# Patient Record
Sex: MALE | Race: WHITE | Employment: UNEMPLOYED | URBAN - METROPOLITAN AREA
[De-identification: names, ages, dates, MRNs, and addresses within clinical notes are randomized per-mention and may not be internally consistent; named-entity substitution may affect disease eponyms.]

---

## 2019-05-01 ENCOUNTER — APPOINTMENT (OUTPATIENT)
Dept: GENERAL RADIOLOGY | Age: 54
End: 2019-05-01
Payer: MEDICARE

## 2019-05-01 ENCOUNTER — HOSPITAL ENCOUNTER (EMERGENCY)
Age: 54
Discharge: HOME | End: 2019-05-01
Attending: EMERGENCY MEDICINE
Payer: MEDICARE

## 2019-05-01 ENCOUNTER — APPOINTMENT (OUTPATIENT)
Dept: CT IMAGING | Age: 54
End: 2019-05-01
Payer: MEDICARE

## 2019-05-01 VITALS
DIASTOLIC BLOOD PRESSURE: 106 MMHG | BODY MASS INDEX: 19.86 KG/M2 | HEIGHT: 67 IN | SYSTOLIC BLOOD PRESSURE: 148 MMHG | WEIGHT: 126.54 LBS | TEMPERATURE: 98.3 F | OXYGEN SATURATION: 97 % | HEART RATE: 86 BPM | RESPIRATION RATE: 18 BRPM

## 2019-05-01 DIAGNOSIS — S51.012A ELBOW LACERATION, LEFT, INITIAL ENCOUNTER: Primary | ICD-10-CM

## 2019-05-01 PROCEDURE — 70450 CT HEAD/BRAIN W/O DYE: CPT

## 2019-05-01 PROCEDURE — 6360000002 HC RX W HCPCS: Performed by: EMERGENCY MEDICINE

## 2019-05-01 PROCEDURE — 6370000000 HC RX 637 (ALT 250 FOR IP): Performed by: EMERGENCY MEDICINE

## 2019-05-01 PROCEDURE — 90471 IMMUNIZATION ADMIN: CPT | Performed by: EMERGENCY MEDICINE

## 2019-05-01 PROCEDURE — 73080 X-RAY EXAM OF ELBOW: CPT

## 2019-05-01 PROCEDURE — 90715 TDAP VACCINE 7 YRS/> IM: CPT | Performed by: EMERGENCY MEDICINE

## 2019-05-01 PROCEDURE — 99284 EMERGENCY DEPT VISIT MOD MDM: CPT

## 2019-05-01 PROCEDURE — 73090 X-RAY EXAM OF FOREARM: CPT

## 2019-05-01 PROCEDURE — 4500000024 HC ED LEVEL 4 PROCEDURE

## 2019-05-01 RX ORDER — CEPHALEXIN 500 MG/1
500 CAPSULE ORAL 3 TIMES DAILY
Qty: 21 CAPSULE | Refills: 0 | Status: SHIPPED | OUTPATIENT
Start: 2019-05-01 | End: 2019-05-08

## 2019-05-01 RX ORDER — CEPHALEXIN 250 MG/1
500 CAPSULE ORAL ONCE
Status: COMPLETED | OUTPATIENT
Start: 2019-05-01 | End: 2019-05-01

## 2019-05-01 RX ORDER — IBUPROFEN 400 MG/1
400 TABLET ORAL EVERY 6 HOURS PRN
COMMUNITY

## 2019-05-01 RX ADMIN — TETANUS TOXOID, REDUCED DIPHTHERIA TOXOID AND ACELLULAR PERTUSSIS VACCINE, ADSORBED 0.5 ML: 5; 2.5; 8; 8; 2.5 SUSPENSION INTRAMUSCULAR at 12:17

## 2019-05-01 RX ADMIN — CEPHALEXIN 500 MG: 250 CAPSULE ORAL at 14:47

## 2019-05-01 ASSESSMENT — ENCOUNTER SYMPTOMS
PHOTOPHOBIA: 0
STRIDOR: 0
VOICE CHANGE: 0
VOMITING: 0
BLOOD IN STOOL: 0
COLOR CHANGE: 0
TROUBLE SWALLOWING: 0
WHEEZING: 0
NAUSEA: 0
SHORTNESS OF BREATH: 0
FACIAL SWELLING: 0
ABDOMINAL PAIN: 0
BACK PAIN: 0

## 2019-05-01 ASSESSMENT — PAIN DESCRIPTION - ORIENTATION: ORIENTATION: LEFT

## 2019-05-01 ASSESSMENT — PAIN DESCRIPTION - PAIN TYPE: TYPE: ACUTE PAIN

## 2019-05-01 ASSESSMENT — PAIN DESCRIPTION - LOCATION: LOCATION: ELBOW

## 2019-05-01 ASSESSMENT — PAIN DESCRIPTION - DESCRIPTORS: DESCRIPTORS: TENDER

## 2019-05-01 ASSESSMENT — PAIN SCALES - GENERAL: PAINLEVEL_OUTOF10: 5

## 2019-05-01 NOTE — ED PROVIDER NOTES
[address] [facility]  eMERGENCY dEPARTMENT eNCOUnter      Pt Name: Erin Mark  MRN: [id number]  [place] 1965  Date of evaluation: 5/1/2019  Provider: Camille Sal MD    [address] [place] [address]       Chief Complaint   Patient presents with    Joint Swelling     fell last pm on sidewalk  causing injury  abrasion lf forehead  rt ear  lf elbow  and rt forearm         HISTORY OF PRESENT ILLNESS   (Location/Symptom, Timing/Onset, Context/Setting, Quality, Duration, Modifying Factors, Severity)  Note limiting factors. Erin Mark is a 48 y.o. male with hx of alcoholism who presents with injuries he suffered during a fall while intoxicated last night at approximately 8:30 PM.  The patient reports she was walking on uneven sidewalk when he stumbled and fell face forward onto the sidewalk. He reports injuries to his face, left elbow, right forearm. He denies any loss of consciousness or vomiting. He reports mild diffuse body pain but does report moderate pain at his left elbow. He also reports mild pain to his forehead, right ear, and right forearm. He is unsure when his last tetanus shot was. He reports symptoms are moderate, constant, unchanged, ongoing for approximately 15 hours, better with rest and worse with use of the affected extremities. Denies any fever, pus drainage, numbness, or weakness. HPI    Nursing Notes were reviewed. REVIEW OFSYSTEMS    (2-9 systems for level 4, 10 or more for level 5)     Review of Systems   Constitutional: Negative for appetite change, fever and unexpected weight change. HENT: Negative for facial swelling, trouble swallowing and voice change. Eyes: Negative for photophobia and visual disturbance. Respiratory: Negative for shortness of breath, wheezing and stridor. Cardiovascular: Negative for chest pain and palpitations. Gastrointestinal: Negative for abdominal pain, blood in stool, nausea and vomiting.    Genitourinary: Negative for difficulty urinating and dysuria. Musculoskeletal: Positive for arthralgias. Negative for back pain, gait problem and neck pain. Skin: Positive for wound. Negative for color change. Neurological: Positive for headaches. Negative for seizures, syncope, speech difficulty, weakness and numbness. Psychiatric/Behavioral: Negative for self-injury and suicidal ideas. Except as noted above the remainder of the review of systems was reviewed and negative. PAST MEDICAL HISTORY     Past Medical History:   Diagnosis Date    Alcoholism ([facility] [address]     GERD (gastroesophageal reflux disease)     Hypertension          SURGICAL HISTORY     No past surgical history on file. CURRENT MEDICATIONS       Previous Medications    IBUPROFEN (ADVIL;MOTRIN) 400 MG TABLET    Take 400 mg by mouth every 6 hours as needed for Pain       ALLERGIES     Patient has no known allergies. FAMILY HISTORY     No family history on file. SOCIAL HISTORY       Social History     Socioeconomic History    Marital status:      Spouse name: Not on file    Number of children: Not on file    Years of education: Not on file    Highest education level: Not on file   Occupational History    Not on file   Social Needs    Financial resource strain: Not on file    Food insecurity:     Worry: Not on file     Inability: Not on file    Transportation needs:     Medical: Not on file     Non-medical: Not on file   Tobacco Use    Smoking status: Current Every Day Smoker     Packs/day: 1.00     Types: Cigarettes   Substance and Sexual Activity    Alcohol use:  Yes     Alcohol/week: 3.6 - 7.2 oz     Types: 6 - 12 Cans of beer per week     Comment: 6-12 beer daily    Drug use: Yes     Types: Marijuana    Sexual activity: Yes     Partners: Female   Lifestyle    Physical activity:     Days per week: Not on file     Minutes per session: Not on file    Stress: Not on file   Relationships    Social connections:     Talks on phone: Not on file     Gets together: Not on file     Attends Mormonism service: Not on file     Active member of club or organization: Not on file     Attends meetings of clubs or organizations: Not on file     Relationship status: Not on file    Intimate partner violence:     Fear of current or ex partner: Not on file     Emotionally abused: Not on file     Physically abused: Not on file     Forced sexual activity: Not on file   Other Topics Concern    Not on file   Social History Narrative    Not on file         PHYSICAL EXAM    (up to 7 for level 4, 8 or more for level 5)     ED Triage Vitals [05/01/19 1129]   BP Temp Temp Source Pulse Resp SpO2 Height Weight   (!) 148/106 98.3 °F (36.8 °C) Oral 86 18 97 % 5' 7\" (1.702 m) 126 lb 8.7 oz (57.4 kg)       Physical Exam   Constitutional: He is oriented to person, place, and time. He appears well-developed and well-nourished. No distress (Non toxic appearing, in no acute distress). HENT:   Head: Normocephalic. Right Ear: External ear normal.   Left Ear: External ear normal.   Hemostatic abrasion to left frontal scalp and to right external ear with no full-thickness laceration or bleeding. No foreign bodies. No raccoon sign, Lynn sign, or hemotympanum. No evidence of basal skull fracture. Eyes: Pupils are equal, round, and reactive to light. Conjunctivae and EOM are normal.   Neck: Normal range of motion. Neck supple. No JVD present. No tracheal deviation present. No midline cervical neck tenderness to palpation. Patient freely ranging neck with no pain   Cardiovascular: Normal rate and intact distal pulses. Pulmonary/Chest: Effort normal and breath sounds normal. No respiratory distress. He has no wheezes. Abdominal: Soft. He exhibits no distension. There is no tenderness. There is no rebound and no guarding. Musculoskeletal: He exhibits tenderness. He exhibits no deformity.    Mild dorsal right mid forearm tenderness with abrasions but no patient on Keflex, and have him follow-up in his clinic either on Friday of this week or Monday of next week. He does not recommend transfer for emergent surgical washout at this time. The patient is afebrile, nontoxic-appearing, expresses understanding and agreement with this plan. Wound is thoroughly irrigated and repaired as in procedure note below. He is aware of the importance of following up with outpatient orthopedic clinic and the risks of not doing this. Standard ER return precautions are reviewed. He expresses understanding and agreement with this plan. CONSULTS:  Orthopedic Surgery, Dr. Neela Galvez:  Unless otherwise noted below, none     Lac Repair  Date/Time: 5/1/2019 2:30 PM  Performed by: Rodolfo Estrella MD  Authorized by: Rodolfo Estrella MD     Consent:     Consent obtained:  Verbal    Consent given by:  Patient    Risks discussed:  Infection, pain, poor cosmetic result and vascular damage  Anesthesia (see MAR for exact dosages): Anesthesia method:  Local infiltration    Local anesthetic:  Lidocaine 1% w/o epi  Laceration details:     Location:  Shoulder/arm    Shoulder/arm location:  L elbow    Length (cm):  2  Repair type:     Repair type:  Simple  Pre-procedure details:     Preparation:  Patient was prepped and draped in usual sterile fashion  Exploration:     Wound extent: no underlying fracture noted and no vascular damage noted      Wound extent comment:  + synovial fluid drainage  Treatment:     Area cleansed with:  Hibiclens    Amount of cleaning:  Extensive    Irrigation solution:  Sterile saline    Irrigation method:  Syringe  Skin repair:     Repair method:  Sutures    Suture size:  4-0    Suture material:  Prolene    Suture technique:  Simple interrupted    Number of sutures:  5  Approximation:     Approximation:  Close  Post-procedure details:     Dressing:  Antibiotic ointment and tube gauze    Patient tolerance of procedure:   Tolerated well, no immediate complications        FINAL IMPRESSION      1. Elbow laceration, left, initial encounter          DISPOSITION/PLAN   DISPOSITION  Discharge      PATIENT REFERRED TO:  Daylin Aparicio [facility] [address] [facility]  [address]  [phone]    In 2 days      [place] [address]  [phone]    If symptoms worsen      DISCHARGE MEDICATIONS:  New Prescriptions    CEPHALEXIN (KEFLEX) 500 MG CAPSULE    Take 1 capsule by mouth 3 times daily for 7 days          (Please note that portions of this note were completed with a voice recognition program.  Efforts were made to edit the dictations but occasionally words aremis-transcribed. )    Pattie Solano MD (electronically signed)  Attending Emergency Physician           Pattie Solano MD  05/01/19 0404

## 2019-05-01 NOTE — ED NOTES
Addiction Navigator-Namita Consult    Conducted brief intervention with pt regarding alcohol use and treatment services/options available. Pt reports that he drinks a 6-pack daily. Pt expresses that he has thought about decreasing alcohol consumption and is willing to accept treatment resources at this time. Discussed with pt the negative health impacts of heavy alcohol use and the importance of decreasing use. Provided pt with the following resources:  · [facility] treatment center   · Additional list of alcohol treatment resources    Pt was receptive throughout intervention. Will continue to assist and follow as needed.             Ilana Pastrana  05/01/19 0101

## 2019-05-22 ENCOUNTER — APPOINTMENT (OUTPATIENT)
Dept: GENERAL RADIOLOGY | Age: 54
End: 2019-05-22
Payer: MEDICARE

## 2019-05-22 ENCOUNTER — HOSPITAL ENCOUNTER (EMERGENCY)
Age: 54
Discharge: HOME | End: 2019-05-22
Payer: MEDICARE

## 2019-05-22 VITALS
OXYGEN SATURATION: 97 % | RESPIRATION RATE: 14 BRPM | WEIGHT: 125.66 LBS | SYSTOLIC BLOOD PRESSURE: 162 MMHG | BODY MASS INDEX: 19.68 KG/M2 | TEMPERATURE: 97.8 F | DIASTOLIC BLOOD PRESSURE: 97 MMHG | HEART RATE: 80 BPM

## 2019-05-22 DIAGNOSIS — S59.909A: Primary | ICD-10-CM

## 2019-05-22 DIAGNOSIS — Z48.02 ENCOUNTER FOR REMOVAL OF SUTURES: ICD-10-CM

## 2019-05-22 PROCEDURE — 99283 EMERGENCY DEPT VISIT LOW MDM: CPT

## 2019-05-22 PROCEDURE — 73080 X-RAY EXAM OF ELBOW: CPT

## 2019-05-22 RX ORDER — DOXYCYCLINE 100 MG/1
100 TABLET ORAL 2 TIMES DAILY
Qty: 14 TABLET | Refills: 0 | Status: SHIPPED | OUTPATIENT
Start: 2019-05-22 | End: 2019-05-29

## 2019-05-22 ASSESSMENT — PAIN SCALES - GENERAL
PAINLEVEL_OUTOF10: 5
PAINLEVEL_OUTOF10: 7
PAINLEVEL_OUTOF10: 7

## 2019-05-22 ASSESSMENT — ENCOUNTER SYMPTOMS
NAUSEA: 0
SHORTNESS OF BREATH: 0
VOMITING: 0
BACK PAIN: 0
DIARRHEA: 0
ABDOMINAL PAIN: 0
EYE PAIN: 0
COUGH: 0

## 2019-05-22 ASSESSMENT — PAIN DESCRIPTION - PAIN TYPE
TYPE: ACUTE PAIN
TYPE: ACUTE PAIN

## 2019-05-22 ASSESSMENT — PAIN DESCRIPTION - ORIENTATION: ORIENTATION: LEFT

## 2019-05-22 ASSESSMENT — PAIN DESCRIPTION - LOCATION: LOCATION: ELBOW

## 2019-05-22 NOTE — ED NOTES
Awake alert  resp easy  Given extra dressing snacks and bus pass. Aware to follow up with ortho.   Given 's phone number for assistance to get primary care doctor for HTN      James Lung, [address] [facility]  05/22/19 6849

## 2019-05-22 NOTE — ED NOTES
Addiction Navigator-Namita Consult     Pt known from previous encounter. Conducted brief intervention with pt regarding alcohol use and treatment services/options available. Pt reports that he has no interest in decreasing or ceasing drinking. Pt reports that he goes to [place] [address] meeting  sometimes at [facility] expresses that he doesn't see a problem with his drinking. Pt agreeable to receiving resources at this time. Provided pt with alcohol treatment resource list.  Pt was receptive to intervention and declines further addiction services.        [facility]  05/22/19 [facility] Casandra Thomass  05/22/19 1836

## 2019-05-22 NOTE — ED PROVIDER NOTES
**EVALUATED BY ADVANCED PRACTICE PROVIDER**        [address] ENCOUNTER      Pt Name: Rivka Verma  SXV:[id number]  [place] 1965  Date of evaluation: 5/22/2019  Provider: SUSAN Malone      Chief Complaint:    Chief Complaint   Patient presents with    Suture / Staple Removal     left elbow after a fall 2 weeks ago  States the elbow has been leaking fluid. has multiple other healing abrasions on the left arm  Smells of alcohol       Nursing Notes, Past Medical Hx, Past Surgical Hx, Social Hx, Allergies, and Family Hx were all reviewed and agreed with or any disagreements were addressed in the HPI.    HPI:  (Location, Duration, Timing, Severity,Quality, Assoc Sx, Context, Modifying factors)  This is a  48 y.o. male who presents to the ED for evaluation of left elbow injury. Context/Setting: patient fell 3 weeks ago and came to the ED, received sutures. He was supposed to follow up with ortho as there was concern of possible bursa injury however patient states he lost the paper. .  Location: left olecranon process. Severity is rated as 7/10. Described as aching in character. Timing: constant. Patient reports occasional clear drainage. Patient denies fever,chills. Modifying factors: none. No other acute concerns, associated symptoms or modifying factors. PastMedical/Surgical History:      Diagnosis Date    Alcoholism ([address]     GERD (gastroesophageal reflux disease)     Hypertension      History reviewed. No pertinent surgical history. Medications:  Previous Medications    IBUPROFEN (ADVIL;MOTRIN) 400 MG TABLET    Take 400 mg by mouth every 6 hours as needed for Pain         Review of Systems:  Review of Systems   Constitutional: Negative for chills, fatigue and fever. Eyes: Negative for pain. Respiratory: Negative for cough and shortness of breath. Cardiovascular: Negative for chest pain.    Gastrointestinal: Negative for abdominal pain, diarrhea, nausea and vomiting. Genitourinary: Negative for dysuria. Musculoskeletal: Positive for arthralgias. Negative for back pain, neck pain and neck stiffness. Skin: Negative for rash. Neurological: Negative for dizziness and headaches. Psychiatric/Behavioral: Negative for confusion. Positives and Pertinent negatives as per HPI. Except as noted above in the ROS, problem specific ROS was completed and is negative. Physical Exam:  Physical Exam   Constitutional: He is oriented to person, place, and time. He appears well-developed. No distress. HENT:   Head: Normocephalic and atraumatic. Eyes: Right eye exhibits no discharge. Left eye exhibits no discharge. Neck: Normal range of motion. Neck supple. Pulmonary/Chest: No stridor. No respiratory distress. Musculoskeletal: Normal range of motion. Left elbow: He exhibits swelling (mild over olecranon process; no effusion or fluctuance) and laceration (5 sutures intact over healing suture with eschar present). He exhibits normal range of motion, no effusion and no deformity. Tenderness found. Olecranon process tenderness noted. Neurological: He is alert and oriented to person, place, and time. No gross facial drooping. Moves all 4 extremities spontaneously. Skin: Skin is warm and dry. He is not diaphoretic. No pallor. Psychiatric: He has a normal mood and affect. His behavior is normal.   Nursing note and vitals reviewed. MEDICAL DECISION MAKING    Vitals:    Vitals:    05/22/19 1559 05/22/19 1659   BP: (!) 162/102 (!) 162/97   Pulse: 80    Resp: 14 14   Temp: 97.8 °F (36.6 °C)    TempSrc: Oral    SpO2: 97%    Weight: 125 lb 10.6 oz (57 kg)        LABS:Labs Reviewed - No data to display     Remainder of labs reviewed and werenegative at this time or not returned at the time of this note.     RADIOLOGY:   Non-plain film images such as CT, Ultrasound and MRI are read by the radiologist. SUSAN Blackmon have directly visualized the radiologic plain film image(s) with the below findings:        Interpretation per the Radiologist below, if available at the time of thisnote:    XR ELBOW LEFT (MIN 3 VIEWS)   Final Result   Olecranon bursal swelling, with internal air. This may be secondary to   laceration/trauma. Infection is also considered. No acute osseous injury is identified. Critical results were called by Dr. Christiana Alexander MD to Elena Daily   on 5/22/2019 at 16:27. No results found. MEDICAL DECISION MAKING / ED COURSE:      PROCEDURES:   Procedures    None    Patient was given:  Medications - No data to display    Patient seen and examined today for suture removal 3 weeks s/p left elbow laceration. See HPI for patient presentation. Patient is in no acute distress, nontoxic, afebrile with unremarkable vital signs. NV intact. FROM intact. 5 sutures removed by myself, patient tolerated well. Patient was supposed to follow up with Dr True Medina d/t concern of possible bursa injury 3 weeks ago, but lost the paperwork. I Spoke with Dr. Claire Mccarthy, radiologist. Gama Pablo of air with olecranon bursal swelling on XR, could be due to the repetitive trauma of his sutures still being in place 3 weeks later now and bending arm with sutures in place. However infection remains in the differential.  On exam there is no warmth, effusion, or fluctuance. He is afebrile. Do not suspect septic joint. Discussed importance of close follow up with orthopedist. Will place on short course of antibiotics to cover for burisitis. At this time I believe patient's presentation does not warrant further workup with labs or imaging in the emergency department and is stable for discharge home. I discussed with Bibiana Natarajan and/or family the exam results, diagnosis, care, prognosis, reasons to return to the emergency department, and the importance of follow up .  They verbalized understanding and were discharged in stable condition. Joseluis Ferrari is well appearing, non-toxic, and afebrile at the time of discharge. The patient tolerated their visit well. I evaluated the patient. The physician was available for consultation as needed. The patient and / or the family were informed of the results of anytests, a time was given to answer questions, a plan was proposed and they agreed with plan. CLINICAL IMPRESSION:  1. Injury of bursa of elbow    2. Encounter for removal of sutures        DISPOSITION        PATIENT REFERRED TO:  Jairo Hernandez MD  [address] [facility]  [facility]  [phone]    Call   ED follow up with orthopedist    [facility]  [place] [id number] [phone]    If symptoms worsen      DISCHARGE MEDICATIONS:  New Prescriptions    DOXYCYCLINE MONOHYDRATE (ADOXA) 100 MG TABLET    Take 1 tablet by mouth 2 times daily for 7 days May substitute another form of Doxycycline if insurance requires.        DISCONTINUED MEDICATIONS:  Discontinued Medications    No medications on file              (Please note the MDM and HPI sections of this note were completed with a voice recognition program.  Efforts weremade to edit the dictations but occasionally words are mis-transcribed.)    Electronically signed, Stephanie Barlow,           Stephanie Barlow  05/22/19 9233

## 2020-07-28 ENCOUNTER — HOSPITAL ENCOUNTER (EMERGENCY)
Age: 55
Discharge: HOME | End: 2020-07-28
Attending: EMERGENCY MEDICINE

## 2020-07-28 VITALS
OXYGEN SATURATION: 96 % | SYSTOLIC BLOOD PRESSURE: 143 MMHG | BODY MASS INDEX: 21.7 KG/M2 | HEART RATE: 80 BPM | TEMPERATURE: 97.8 F | WEIGHT: 138.23 LBS | RESPIRATION RATE: 16 BRPM | HEIGHT: 67 IN | DIASTOLIC BLOOD PRESSURE: 92 MMHG

## 2020-07-28 PROCEDURE — 99281 EMR DPT VST MAYX REQ PHY/QHP: CPT

## 2020-07-28 NOTE — CARE COORDINATION
ED Advocate saw patient and discussed the importance of a Primary Care Physician. Patient given information for [facility] to call to make an appointment. Patient also given information and list for PCP with [facility].

## 2020-07-28 NOTE — ED NOTES
Acknowledged pt by pt's name. Verified pt by name and date of birth. Checked arm band, allergies, reviewed past medical history. Introduced myself to patient  Duration of ED plan of care explained to patient  Explained planned tests and procedures  Thanked patient for coming to [facility].    Asked if there was anything else I could do for the patient before exiting room. CB in reach.      Haley Rivera RN  07/28/20 1258

## 2020-07-28 NOTE — ED PROVIDER NOTES
allergies. FAMILY HISTORY     History reviewed. No pertinent family history. SOCIAL HISTORY       Social History     Socioeconomic History    Marital status:      Spouse name: None    Number of children: None    Years of education: None    Highest education level: None   Occupational History    None   Social Needs    Financial resource strain: None    Food insecurity     Worry: None     Inability: None    Transportation needs     Medical: None     Non-medical: None   Tobacco Use    Smoking status: Current Every Day Smoker     Packs/day: 1.00     Types: Cigarettes    Smokeless tobacco: Never Used   Substance and Sexual Activity    Alcohol use: Yes     Alcohol/week: 6.0 - 12.0 standard drinks     Types: 6 - 12 Cans of beer per week     Comment: 6-12 beer daily    Drug use: Yes     Types: Marijuana    Sexual activity: Yes     Partners: Female   Lifestyle    Physical activity     Days per week: None     Minutes per session: None    Stress: None   Relationships    Social connections     Talks on phone: None     Gets together: None     Attends Gnosticism service: None     Active member of club or organization: None     Attends meetings of clubs or organizations: None     Relationship status: None    Intimate partner violence     Fear of current or ex partner: None     Emotionally abused: None     Physically abused: None     Forced sexual activity: None   Other Topics Concern    None   Social History Narrative    None       SCREENINGS             PHYSICAL EXAM    (up to 7 for level 4, 8 or more for level 5)     ED Triage Vitals [07/28/20 1056]   BP Temp Temp Source Pulse Resp SpO2 Height Weight   (!) 143/92 97.8 °F (36.6 °C) Oral 80 16 96 % 5' 7\" (1.702 m) 138 lb 3.7 oz (62.7 kg)         Physical Exam   Constitutional: Awake and alert. Very pleasant. Appears comfortable. Head: No visible evidence of trauma. Normocephalic. Eyes: Pupils equal and reactive. No photophobia.   Conjunctiva twice daily for 3 to 5 days. Clean the area with warm soapy water. I advised him to follow-up with a primary care physician as needed. If condition worsens or new symptoms develop, he was advised to return immediately to the emergency department. MDM      REASSESSMENT              CRITICAL CARE TIME   Total Critical Care time was 0 minutes, excluding separately reportable procedures. There was a high probability of clinically significant/life threatening deterioration in the patient's condition which required my urgent intervention. CONSULTS:  None    PROCEDURES:  Unless otherwise noted below, none     Procedures    Suture removal:    The lip laceration was sterilely prepped with an alcohol swab. There were 3 small simple sutures present in the laceration that were easily removed under sterile technique. The patient tolerated the procedure well. FINAL IMPRESSION      1. Encounter for removal of sutures          DISPOSITION/PLAN   DISPOSITION Decision To Discharge 07/28/2020 11:02:47 AM      PATIENT REFERRED TO:  [facility]) Referral  Call [phone] for an appointment  Call today        DISCHARGE MEDICATIONS:  New Prescriptions    No medications on file     Controlled Substances Monitoring:     No flowsheet data found. (Please note that portions of this note were completed with a voice recognition program.  Efforts were made to edit the dictations but occasionally words are mis-transcribed. )    [id number] Edinson Martinez DO (electronically signed)  Attending Emergency Physician          Misbah Cardona DO  07/28/20 0542